# Patient Record
Sex: FEMALE | Employment: STUDENT | URBAN - METROPOLITAN AREA
[De-identification: names, ages, dates, MRNs, and addresses within clinical notes are randomized per-mention and may not be internally consistent; named-entity substitution may affect disease eponyms.]

---
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
## 2021-09-17 PROBLEM — M25.312 INSTABILITY OF LEFT SHOULDER JOINT: Status: RESOLVED | Noted: 2018-02-06 | Resolved: 2021-09-17

## 2021-09-17 PROBLEM — S52.611A CLOSED DISPLACED FRACTURE OF STYLOID PROCESS OF RIGHT ULNA, INITIAL ENCOUNTER: Status: RESOLVED | Noted: 2020-02-05 | Resolved: 2021-09-17

## 2024-10-17 ENCOUNTER — OFFICE VISIT (OUTPATIENT)
Dept: INTEGRATIVE MEDICINE | Facility: CLINIC | Age: 24
End: 2024-10-17

## 2024-10-17 DIAGNOSIS — G47.00 INSOMNIA, UNSPECIFIED TYPE: Primary | ICD-10-CM

## 2024-10-17 DIAGNOSIS — M54.2 NECK PAIN ON LEFT SIDE: ICD-10-CM

## 2024-10-18 NOTE — PATIENT INSTRUCTIONS
Increase water intake to 70 Oz per day.    Make sure to do something you enjoy everyday; hiking, painting, etc.    Consider joining a Jose ji or yoga class    Herbal medicine:  You will receive an email from a company called [employer].  Once you register, you will see the herbal medicine that I prescribed along with the directions.

## 2024-10-18 NOTE — PROGRESS NOTES
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   Chief Complaint: Insomnia  Secondary Complaint: LT neck pain  Tertiary Complaint: anxiety    Self reported health status:     Patient reported severity of symptom(s) over the last 24 hours  With zero reporting no symptoms and 10 reporting the worst severity    Symptom 1: 4  Symptom 2: 3  Symptom 3: 6    Response to last TX: NA    HPI: Bobbi suffers from insomnia for 2-3 years which started sometime during the pandemic. She struggles falling asleep but finds that the melatonin helps her stay asleep.  Due to the lack of sleep she feels fatigued and will sometimes miss appointments.    Additionally, he anxiety is high and has been that way for at least 5 years.  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    Today she has LT sided neck pain which is not uncommon complaint. The trapezius is hypertonic.    Menstrual cycle is 28 days and recently started having bad cramps before and during day 1 of the menstrual cycle.    We discussed the importance of doing things that she enjoys such as painting and walking in nature.  Additionally, yoga, Jose ji and qi gong were suggested.    Will started Rosa alisia wan herbal medicine.    Objective (Pulse, Tongue, Vitals): /52. Pulse is wiry in LT annabel and sl. Big and rapid.    TCM Diagnosis: LR heat disturbing the HT    Plan of Care: Acupuncture Therapy  1st set: Bilateral frost men, an diane  2nd set: RT: LI 4, SJ 5, LR 8, SP 9, 6, 4, LT: PC 6, HT 5, LG 7, ST 40, GB 41  3rd set: RT: LR 5, 4.5  Chinese herbal medicine: rosa alisia wan 4 BID    Patient Goals: Improve quality and length of sleep so she has enough energy during the day, improve anxiety by 50%    Face Time: 38 minutes  Total Time: 60 minutes      Treatment performed by Jagjit CAMP LAc. at [facility].    No follow-ups on file.    Jagjit Mendez L.AC  10/17/2024  7:07 PM

## 2024-10-25 ENCOUNTER — OFFICE VISIT (OUTPATIENT)
Dept: INTEGRATIVE MEDICINE | Facility: CLINIC | Age: 24
End: 2024-10-25

## 2024-10-25 DIAGNOSIS — F41.9 ANXIETY: ICD-10-CM

## 2024-10-25 DIAGNOSIS — G47.00 INSOMNIA, UNSPECIFIED TYPE: Primary | ICD-10-CM

## 2024-10-25 NOTE — PROGRESS NOTES
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   Chief Complaint: Insomnia  Secondary Complaint: LT neck pain  Tertiary Complaint: anxiety    Self reported health status:     Patient reported severity of symptom(s) over the last 24 hours  With zero reporting no symptoms and 10 reporting the worst severity    Symptom 1: 4  Symptom 2: 3  Symptom 3: 6    Response to last TX: Bobbi felt very relaxed after the last tx and her anxiety decreased.  She has not noticed ain her insomnia and we discussed bedtime rituals and the possibility of journaling before bed.    Neck pain is more centrally located today but improved after the tx for 2-3 days.    Is on day 14 of menstrual cycle.    She has yet to start the Chinese herbal medicine.    HPI 10/17/23: Bobbi suffers from insomnia for 2-3 years which started sometime during the pandemic. She struggles falling asleep but finds that the melatonin helps her stay asleep.  Due to the lack of sleep she feels fatigued and will sometimes miss appointments.    Additionally, he anxiety is high and has been that way for at least 5 years.  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    Today she has LT sided neck pain which is not uncommon complaint. The trapezius is hypertonic.    Menstrual cycle is 28 days and recently started having bad cramps before and during day 1 of the menstrual cycle.    We discussed the importance of doing things that she enjoys such as painting and walking in nature.  Additionally, yoga, Jose ji and qi gong were suggested.    Will started Rosa alisia wan herbal medicine.    Objective (Pulse, Tongue, Vitals): /52. Pulse is wiry in LT annabel and sl. Big and rapid.    TCM Diagnosis: LR heat disturbing the HT    Plan of Care: Acupuncture Therapy  1st set: Bilateral frost men, an diane  2nd set: RT: LI 4, SJ 5, LR 8, SP 9, 6, 4, LT: Tung22.01, 22.02, ST 40, GB 41  3rd set: RT: LR 5, 4.5  Chinese herbal medicine: rosa alisia wan  4 BID    Patient Goals: Improve quality and length of sleep so she has enough energy during the day, improve anxiety by 50%    Face Time: 28 minutes  Total Time: 50 minutes      Treatment performed by Jagjit CAMP LAc. at [facility].    No follow-ups on file.    Jagjit Mendez L.AC  10/17/2024  7:07 PM

## 2024-11-04 ENCOUNTER — OFFICE VISIT (OUTPATIENT)
Dept: INTEGRATIVE MEDICINE | Facility: CLINIC | Age: 24
End: 2024-11-04

## 2024-11-04 DIAGNOSIS — G47.00 INSOMNIA, UNSPECIFIED TYPE: Primary | ICD-10-CM

## 2024-11-04 NOTE — PROGRESS NOTES
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   Chief Complaint: Insomnia  Secondary Complaint: neck pain  Tertiary Complaint: anxiety    Self reported health status:     Patient reported severity of symptom(s) over the last 24 hours  With zero reporting no symptoms and 10 reporting the worst severity    Symptom 1: 3  Symptom 2: 2-3  Symptom 3: 5-6    Response to last TX: Bobbi is getting more sleep but still wakes frequently. Her energy has improved which may be more correlated with the increase in sleep.    Neck pain is more centrally located today but improved after the tx for 2-3 days.    Is on day 21 of menstrual cycle.    Anxiety is slightly improved but present.  She has been reading more and finds this soothing.  Is also planning on painting today.    She has yet to start the Chinese herbal medicine but is planning on doing that tonight.    HPI 10/17/23: Bobbi suffers from insomnia for 2-3 years which started sometime during the pandemic. She struggles falling asleep but finds that the melatonin helps her stay asleep.  Due to the lack of sleep she feels fatigued and will sometimes miss appointments.    Additionally, he anxiety is high and has been that way for at least 5 years.  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    Today she has LT sided neck pain which is not uncommon complaint. The trapezius is hypertonic.    Menstrual cycle is 28 days and recently started having bad cramps before and during day 1 of the menstrual cycle.    We discussed the importance of doing things that she enjoys such as painting and walking in nature.  Additionally, yoga, Jose ji and qi gong were suggested.    Will started Neha crump joe herbal medicine.    Objective (Pulse, Tongue, Vitals): /72. Pulse is wiry in LT annabel and sl. Big and rapid.    TCM Diagnosis: LR heat disturbing the HT    Plan of Care: Acupuncture Therapy  1st set: Bilateral frost men, an diane  2nd set: RT: ELLIS  4, SJ 5, LR 8, SP 9, 6, 4, LT: PC 7.2, HT 5, LG 7, ST 40, GB 41  3rd set: RT: LR 5, 4.5  Chinese herbal medicine: rosa alisia wan 4 BID    Patient Goals: Improve quality and length of sleep so she has enough energy during the day, improve anxiety by 50%    Face Time: 28 minutes  Total Time: 50 minutes      Treatment performed by Jagjit CAMP LAc. at [facility].    No follow-ups on file.    Jagjit Mendez L.AC  10/17/2024  7:07 PM

## 2024-12-16 ENCOUNTER — HOSPITAL ENCOUNTER (OUTPATIENT)
Dept: GENERAL RADIOLOGY | Age: 24
Discharge: HOME | End: 2024-12-16
Attending: STUDENT IN AN ORGANIZED HEALTH CARE EDUCATION/TRAINING PROGRAM
Payer: COMMERCIAL

## 2024-12-16 ENCOUNTER — TELEPHONE (OUTPATIENT)
Dept: ORTHOPEDICS CLINIC | Facility: CLINIC | Age: 24
End: 2024-12-16

## 2024-12-16 ENCOUNTER — OFFICE VISIT (OUTPATIENT)
Dept: ORTHOPEDICS CLINIC | Facility: CLINIC | Age: 24
End: 2024-12-16
Payer: COMMERCIAL

## 2024-12-16 DIAGNOSIS — M54.2 NECK PAIN: ICD-10-CM

## 2024-12-16 DIAGNOSIS — M54.2 NECK PAIN: Primary | ICD-10-CM

## 2024-12-16 PROCEDURE — 99204 OFFICE O/P NEW MOD 45 MIN: CPT | Performed by: STUDENT IN AN ORGANIZED HEALTH CARE EDUCATION/TRAINING PROGRAM

## 2024-12-16 PROCEDURE — 72050 X-RAY EXAM NECK SPINE 4/5VWS: CPT | Performed by: STUDENT IN AN ORGANIZED HEALTH CARE EDUCATION/TRAINING PROGRAM

## 2024-12-16 PROCEDURE — G2211 COMPLEX E/M VISIT ADD ON: HCPCS | Performed by: STUDENT IN AN ORGANIZED HEALTH CARE EDUCATION/TRAINING PROGRAM

## 2024-12-16 RX ORDER — MELOXICAM 7.5 MG/1
7.5 TABLET ORAL DAILY
Qty: 14 TABLET | Refills: 0 | Status: SHIPPED | OUTPATIENT
Start: 2024-12-16

## 2024-12-17 NOTE — H&P
[facility] - ORTHOPEDICS  [address], [address], [place] [address]  [address] [place] [id number]  [phone]     NEW PATIENT VISIT - HISTORY AND PHYSICAL EXAMINATION     Name: Bobbi Cruz   MRN: [id number]  Date: 12/16/24       CC: neck pain    REFERRED BY: Marisol Rodas MD    HPI:   Bobbi Cruz is a very pleasant 24 year old female who presents today for evaluation of neck pain. The distribution of symptoms are: 100% neck pain and 0% arm pain. The symptoms began many year(s) ago without any significant injury. Since the onset, the symptoms have wax and waned over time.  The patient reports no numbness and no weakness.  The symptom characteristics are as follows: Patient is a 24-year-old female presenting with predominate neck pain.  No radicular symptoms.  Patient has been working with acupuncture..     Prior spine surgery: none.    Bowel and bladder symptoms: absent.    The patient has not had issues with balance and/or hand dexterity problems such as changes in penmanship or the use of buttons or zippers.    Treatment up to this time has included:    Evaluation: other [facility] provider  NSAIDS: have not been prescribed  Narcotic use: None  Physical therapy: None  Spinal injections: None      PMH:   Past Medical History:    Closed displaced fracture of styloid process of right ulna, initial encounter    Hematuria    Instability of left shoulder joint    Kidney anomaly, congenital    Two vessel cord (HCC)       PAST SURGICAL HX:  Past Surgical History:   Procedure Laterality Date    Anesth,nose,sinus surgery  12/2020    deviated septum     Other surgical history      Biopsy left leg    [place] teeth removed  12/2020       FAMILY HX:  Family History   Problem Relation Age of Onset    Diabetes Maternal Grandfather     Heart Disease Maternal Grandfather     Heart Disease Paternal Grandfather     Cancer Paternal Grandfather     Diabetes Paternal Grandfather     High  Blood Pressure Paternal Grandfather     High Blood Pressure Father     Arthritis Paternal Grandmother        ALLERGIES:  Seasonal    MEDICATIONS:   Current Outpatient Medications   Medication Sig Dispense Refill    Meloxicam 7.5 MG Oral Tab Take 1 tablet (7.5 mg total) by mouth daily. 14 tablet 0    Clindamycin Phosphate 1 % External Lotion Apply to face and back every morning (Patient not taking: Reported on 9/17/2021) 60 mL 3    tretinoin 0.025 % External Cream Apply nightly to face followed by moisturizer. (Patient not taking: Reported on 9/17/2021) 20 g 2       ROS: A comprehensive 14 point review of systems was performed and was negative aside from the aforementioned per history of present illness.    SOCIAL HX:  Social History     Tobacco Use    Smoking status: Never    Smokeless tobacco: Never   Substance Use Topics    Alcohol use: Not Currently     Alcohol/week: 0.0 standard drinks of alcohol         PE:   There were no vitals filed for this visit.  Estimated body mass index is 26.57 kg/m² as calculated from the following:    Height as of 10/20/21: 5' 3\" (1.6 m).    Weight as of 10/20/21: 150 lb (68 kg).    Physical Exam  Constitutional:       Appearance: Normal appearance.   HENT:      Head: Normocephalic and atraumatic.   Eyes:      Extraocular Movements: Extraocular movements intact.   Cardiovascular:      Pulses: Normal pulses. Skin warm and well perfused.  Pulmonary:      Effort: Pulmonary effort is normal. No respiratory distress.   Skin:     General: Skin is warm.   Psychiatric:         Mood and Affect: Mood normal.     Spine Exam:    Normal gait without difficulty  Able to heel, toe, tandem gait without difficulty  Level shoulders and hips in even stance    Normal C-spine ROM    No tenderness to palpation of C-spine    Spluring: negative    Daniels's: negative  IRR: negative  Sustained clonus: negative     and release: normal  Rhomberg: normal    UE Strength: 5/5 D Bi Tri WE FDS IO  UE Sensation:  normal in C5-T1 distribution  UE reflexes: normal    Radiographic Examination/Diagnostics:  XR personally viewed, independently interpreted and radiology report was reviewed.  X-ray of the cervical spine demonstrates mild degenerative changes C4-5 otherwise no acute pathology.    IMPRESSION: Bobbi Cruz is a 24 year old female with neck pain, likely myofascial    PLAN:     We reviewed the patients history, symptoms, exam findings, and imaging today.  We had a detailed discussion outlining the etiology, anatomy, pathophysiology, and natural history of neck pain. The typical management of this condition may include lifestyle modification, NSAIDs, physical therapy. Based on our discussion today we would like to have the patient initiate our recommendations for continued conservative therapy in the treatment of their condition noted in the assessment section.    - Referred patient to PT  - Provided HEP  - Prescribed Meloxicam    FOLLOW-UP:  We will see her back in follow-up in 3 months, or sooner if any problems arise. Patient understands and agrees with plan.       Bienvenido Valderrama MD  Orthopedic Spine Surgeon  [facility]   [address] [place] [address], [email or web address]  [email or web address]  t: [phone]   f: [phone]        This note was dictated using Dragon software.  While it was briefly proofread prior to completion, some grammatical, spelling, and word choice errors due to dictation may still occur.

## 2024-12-19 ENCOUNTER — OFFICE VISIT (OUTPATIENT)
Dept: INTEGRATIVE MEDICINE | Facility: CLINIC | Age: 24
End: 2024-12-19

## 2024-12-19 DIAGNOSIS — M54.2 NECK PAIN: ICD-10-CM

## 2024-12-19 DIAGNOSIS — G47.00 INSOMNIA, UNSPECIFIED TYPE: Primary | ICD-10-CM

## 2024-12-19 NOTE — PROGRESS NOTES
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   Chief Complaint: Insomnia  Secondary Complaint: neck pain  Tertiary Complaint: anxiety    Self reported health status:     Patient reported severity of symptom(s) over the last 24 hours  With zero reporting no symptoms and 10 reporting the worst severity    Symptom 1: 3-4  Symptom 2: 2-5  Symptom 3: 5    Response to last TX: Have not seen Bobbi for 1 month.    She is getting more sleep but still wakes frequently.  She stated that it is the most difficult going to sleep.  The acupuncture had been helping with her sleep prior to her vacation.It takes her along time to sleep and then she feels exhausted because she can't sleep.    Neck pain has been pronounced over the past two weeks and may be worse when working the computer.  We discussed proper positioning.  Qi gong movements were provided to help with neck tension.  Evaluation by Bienvenido Valderrama was negative.    Is on day 3 of menstrual cycle and last valerio was 30 days. Only mild cramps and mood swings were not severe.    Anxiety is slightly improved but present. She has been painting and this helps and also got a new job.    She started the Chinese herbal medicine but mistakenly only took 4 ills one time per day.  We discussed the importance of taking the full dose.  Recently she tried ashwaganda while on vacation and this helped.    HPI 10/17/23: Bobbi suffers from insomnia for 2-3 years which started sometime during the pandemic. She struggles falling asleep but finds that the melatonin helps her stay asleep.  Due to the lack of sleep she feels fatigued and will sometimes miss appointments.    Additionally, he anxiety is high and has been that way for at least 5 years.  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    Today she has LT sided neck pain which is not uncommon complaint. The trapezius is hypertonic.    Menstrual cycle is 28 days and recently started having bad cramps  before and during day 1 of the menstrual cycle.    We discussed the importance of doing things that she enjoys such as painting and walking in nature.  Additionally, yoga, Jose ji and qi gong were suggested.    Will started Rosa alisia wan herbal medicine.    Objective (Pulse, Tongue, Vitals): /72. Pulse is wiry in LT annabel and sl. Big and rapid.    TCM Diagnosis: LR heat disturbing the HT    Plan of Care: Acupuncture Therapy  1st set: Bilateral frost men, an diane, GB 21  2nd set: RT: LI 4, SJ 5, LR 8, SP 9, 6, 4, LT: PC 6, HT 5, LG 7, ST 40, GB 41  3rd set: RT: LR 5, 4.5  Chinese herbal medicine: rosa alisia wan 4 BID    Patient Goals: Improve quality and length of sleep so she has enough energy during the day, improve anxiety by 50%    Face Time: 28 minutes  Total Time: 50 minutes      Treatment performed by Jagjit CAMP LAc. at [facility].    No follow-ups on file.    Jagjit Mendez L.AC  10/17/2024  7:07 PM

## 2024-12-19 NOTE — PATIENT INSTRUCTIONS
Practice the qi gong exercises as demonstrated today.  Turn the head with coordinated breathing - 8 times  Touch the walter and pull down with coordinated breathing- 8 times  Bring hands up and down with coordinated breathing 5-10 minutes

## 2024-12-23 ENCOUNTER — OFFICE VISIT (OUTPATIENT)
Dept: INTEGRATIVE MEDICINE | Facility: CLINIC | Age: 24
End: 2024-12-23

## 2024-12-23 DIAGNOSIS — R51.9 TEMPORAL HEADACHE: Primary | ICD-10-CM

## 2024-12-24 NOTE — PROGRESS NOTES
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   Chief Complaint: HA  Secondary Complaint: Neck pain  Tertiary Complaint: anxiety  Quaternary Complaint: Insomnia     Self reported health status:     Patient reported severity of symptom(s) over the last 24 hours  With zero reporting no symptoms and 10 reporting the worst severity    Symptom 1: 7  Symptom 2: 2  Symptom 3: 3-4  Symptom 4: 4    Response to last TX: Bobbi has noticed a significant improvement in neck pain.    Her insomnia varies from day to day; one day she sleeps and one day she has a hard time sleeping.    The anxiety is improved but can be exacerbated by interacting with her family sometimes.  She would like to have her own place.  When she was in [place] on vacation her sleep was much improved.    Her main complaint today is a severe temporal HA that extends to the occiput. She has had it all day and it is rated 7-8/10.    Is on day 7 of menstrual cycle and last valerio was 30 days. Only mild cramps and mood swings were not severe.      She started the Chinese herbal medicine and is now taking the full dose but has not noticed a change.  Recently she tried ashwaganda while on vacation and this helped.    HPI 10/17/23: Bobbi suffers from insomnia for 2-3 years which started sometime during the pandemic. She struggles falling asleep but finds that the melatonin helps her stay asleep.  Due to the lack of sleep she feels fatigued and will sometimes miss appointments.    Additionally, he anxiety is high and has been that way for at least 5 years.  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    Today she has LT sided neck pain which is not uncommon complaint. The trapezius is hypertonic.    Menstrual cycle is 28 days and recently started having bad cramps before and during day 1 of the menstrual cycle.    We discussed the importance of doing things that she enjoys such as painting and walking in nature.  Additionally,  yoga, Jose ji and qi gong were suggested.    Will started Rosa alisia wan herbal medicine.    Objective (Pulse, Tongue, Vitals): /72. Pulse is wiry in LT annabel and sl. Big and rapid.    TCM Diagnosis: LR heat disturbing the HT    Plan of Care: Acupuncture Therapy  1st set: Bilateral frost men,  2nd set: LT: HT 8, GB 34, 35, 36, RT: Tung 11.01  3rd set: Bilateral: LR 5, 4.5  Chinese herbal medicine: rosa alisia wan 4 BID    Patient Goals: Improve quality and length of sleep so she has enough energy during the day, improve anxiety by 50%    Face Time: 28 minutes  Total Time: 50 minutes      Treatment performed by Jagjit CAMP LAc. at [facility].    No follow-ups on file.    Jagjit Mendez L.AC  10/17/2024  7:07 PM

## 2024-12-30 ENCOUNTER — HOSPITAL ENCOUNTER (EMERGENCY)
Facility: HOSPITAL | Age: 24
Discharge: LEFT BEFORE DISCHARGE | End: 2024-12-30
Payer: COMMERCIAL

## 2025-03-11 ENCOUNTER — OFFICE VISIT (OUTPATIENT)
Dept: INTEGRATIVE MEDICINE | Facility: CLINIC | Age: 25
End: 2025-03-11
Payer: COMMERCIAL

## 2025-03-11 VITALS
BODY MASS INDEX: 26.12 KG/M2 | DIASTOLIC BLOOD PRESSURE: 64 MMHG | SYSTOLIC BLOOD PRESSURE: 102 MMHG | HEIGHT: 63 IN | WEIGHT: 147.44 LBS

## 2025-03-11 DIAGNOSIS — F41.9 ANXIETY: ICD-10-CM

## 2025-03-11 DIAGNOSIS — E55.9 VITAMIN D DEFICIENCY: Primary | ICD-10-CM

## 2025-03-11 DIAGNOSIS — G47.00 INSOMNIA, UNSPECIFIED TYPE: ICD-10-CM

## 2025-03-11 DIAGNOSIS — E34.9 HORMONE IMBALANCE: ICD-10-CM

## 2025-03-11 DIAGNOSIS — E28.2 PCOS (POLYCYSTIC OVARIAN SYNDROME): ICD-10-CM

## 2025-03-11 PROCEDURE — G2211 COMPLEX E/M VISIT ADD ON: HCPCS | Performed by: PHYSICIAN ASSISTANT

## 2025-03-11 PROCEDURE — 99204 OFFICE O/P NEW MOD 45 MIN: CPT | Performed by: PHYSICIAN ASSISTANT

## 2025-03-11 PROCEDURE — 3008F BODY MASS INDEX DOCD: CPT | Performed by: PHYSICIAN ASSISTANT

## 2025-03-11 PROCEDURE — 3078F DIAST BP <80 MM HG: CPT | Performed by: PHYSICIAN ASSISTANT

## 2025-03-11 PROCEDURE — 3074F SYST BP LT 130 MM HG: CPT | Performed by: PHYSICIAN ASSISTANT

## 2025-03-11 RX ORDER — METFORMIN HYDROCHLORIDE 500 MG/1
500 TABLET, EXTENDED RELEASE ORAL
COMMUNITY
Start: 2025-01-27

## 2025-03-11 NOTE — PATIENT INSTRUCTIONS
Vitamin D3 + K from [employer]    Provides 5,000 IU per softgel of vitamin D (as D3), designed for greater absorption. This high-potency formula also includes bioavailable forms of vitamin K2 (menaquinone-7) to complement vitamin D.    ?Suggested Use:  Take one softgel daily     2. Therapist: Ines Gallego, [facility]  [place]     3. Inositol (Powder) by [employer] for healthy blood sugar regulation    Inositol is a component of the B-complex family. Natanael-inositol is the primary form of inositol found in the central nervous system. It plays an important role in cell membrane formation and serves as part of the phosphatidylinositol secondary messenger system, supporting serotonin, norepinenephrine and cholinergic receptor function. As a result, inositol may support healthy mood, emotional wellness and behavior, and help lessen occasional nervous tension. Research also suggests that natanael-inositol may help to support healthy ovulatory activity, ovarian function, and reproductive system function. Also, Natanael-inositol inhibits duodenal glucose absorption and reduces blood glucoses rises.      Take 2 scoops in water, 2 times daily, with or between meals.  2 scoops = 4g     4. Best-Rest Formula by [employer]    Best-Rest Formula supports healthy sleep cycles by encouraging an easy transition to sleep and a restful nights sleep. The supplement contains passionflower, chamomile, lemon balm and hops, which have been clinically shown to naturally calm and relax the central nervous system.*    Promotes the onset of sleep as well as sleep quality*  Supports natural relaxation of the nervous system*  Made with flower extracts and other natural ingredients  Made with hypoallergenic, vegan ingredients    Take 1-2 capsules 30-60 minutes before bedtime.     5. Go get labs drawn day 20-22 of menstrual cycle.

## 2025-03-11 NOTE — PROGRESS NOTES
Bobbi Cruz is a 24 year old female.  Chief Complaint   Patient presents with    Establish Care     GI       HPI:   24 yr old female new patient presents today for an initial evaluation regarding the above.     Sleep Issues- needs Lunesta to fall asleep but still wakes up during night and hard to get back to sleep.   X5yrs, but worse in the last year. About that time she moved back in with parents.   Works out around 7-8pm   Goes to bed 11 or 12    Anxious and feels burnt out.   Feels she has always been anxious person.   Did see a therapist in the past but was not helpful.     Chest pain and SOB at they gym- picked up a heart monitor, Rx'd by PCP.   Suspects cyst in chest is causing the pain.    GI: BM daily     Gyn: Menses became more painful in the last 1-2yrs  H/o high testosterone, suspect PCOS (chin hair growth) - so was prescribed Metformin 500mg, Spironolactone for it (from Dr. Bower) and naproxen for pain (from Lita Cardoso)     Energy very low after work     Low Vit D  Low Ferritin - takes iron daily, but still low, going for infusion soon    Pert Med Hx      Fam Hx Significant for:  Mental Health, Mom  Prediabetic - Mom and Dad    Lifestyle Factors affecting health:  Diet - Eats at home, avoids fast food.   Could use to eat more protein, otherwise balanced.     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        REVIEW OF SYSTEMS:   Review of Systems     Negative except for pertinent ROS in HPI      FAMILY HISTORY:      Family History   Problem Relation Age of Onset    Diabetes Maternal Grandfather     Heart Disease Maternal Grandfather     Heart Disease Paternal Grandfather     Cancer Paternal Grandfather     Diabetes Paternal Grandfather     High Blood Pressure Paternal Grandfather     High Blood  Pressure Father     Arthritis Paternal Grandmother        MEDICAL HISTORY:     Past Medical History:    Closed displaced fracture of styloid process of right ulna, initial encounter    Hematuria    Instability of left shoulder joint    Kidney anomaly, congenital    Two vessel cord (HCC)       CURRENT MEDICATIONS:     Current Outpatient Medications   Medication Sig Dispense Refill    metFORMIN  MG Oral Tablet 24 Hr 1 tablet (500 mg total).      Meloxicam 7.5 MG Oral Tab Take 1 tablet (7.5 mg total) by mouth daily. (Patient not taking: Reported on 3/11/2025) 14 tablet 0    Clindamycin Phosphate 1 % External Lotion Apply to face and back every morning (Patient not taking: Reported on 9/17/2021) 60 mL 3    tretinoin 0.025 % External Cream Apply nightly to face followed by moisturizer. (Patient not taking: Reported on 9/17/2021) 20 g 2       SOCIAL HISTORY:       Social History     Socioeconomic History    Marital status: Single   Tobacco Use    Smoking status: Never    Smokeless tobacco: Never   Vaping Use    Vaping status: Never Used   Substance and Sexual Activity    Alcohol use: Not Currently     Comment: occacially    Drug use: Not Currently    Sexual activity: Never     Social Drivers of Health     Food Insecurity: Low Risk  (11/13/2023)    Received from [facility]    Food Insecurity     Have there been times that your food ran out, and you didn't have money to get more?: No     Are there times that you worry that this might happen?: No   Transportation Needs: Low Risk  (11/13/2023)    Received from [facility]    Transportation Needs     Do you have trouble getting transportation to medical appointments?: No       SURGICAL HISTORY:     Past Surgical History:   Procedure Laterality Date    Anesth,nose,sinus surgery  12/2020    deviated septum     Other surgical history      Biopsy left leg    [place] teeth removed  12/2020       PHYSICAL EXAM:     Vitals:    03/11/25  1452   BP: 102/64   BP Location: Right arm   Patient Position: Sitting   Cuff Size: adult   Weight: 147 lb 6.6 oz (66.9 kg)   Height: 5' 3\" (1.6 m)       Physical Exam  Vitals reviewed.   Constitutional:       General: She is not in acute distress.     Appearance: Normal appearance.   HENT:      Mouth/Throat:      Mouth: Mucous membranes are moist.      Pharynx: Oropharynx is clear.   Eyes:      Extraocular Movements: Extraocular movements intact.      Conjunctiva/sclera: Conjunctivae normal.   Cardiovascular:      Rate and Rhythm: Normal rate and regular rhythm.      Heart sounds: Normal heart sounds.   Pulmonary:      Effort: Pulmonary effort is normal.      Breath sounds: Normal breath sounds.   Musculoskeletal:         General: No swelling or deformity.   Skin:     General: Skin is warm and dry.   Neurological:      General: No focal deficit present.      Mental Status: She is alert and oriented to person, place, and time.   Psychiatric:         Mood and Affect: Mood normal.         Behavior: Behavior normal.         Thought Content: Thought content normal.         Judgment: Judgment normal.          ASSESSMENT AND PLAN:     No visits with results within 6 Month(s) from this visit.   Latest known visit with results is:   Orders Only on 09/19/2021   Component Date Value Ref Range Status    SARS-CoV-2 (COVID-19) RT-PCR (Gene* 09/19/2021 NOT DETECTED  Not Detected Final    A \"Detected\" result is considered a positive test result for COVID-19.   A \"Not Detected\" result for this test means that SARS-CoV-2 RNA was not present in the sample above the limit of detection of the assay.   An \"Inconclusive\" result for this test means that the presence or absence of COVID-19 viral RNA cannot be determined, and recollection and testing by a different method should be considered.     This test is intended for the qualitative detection of nucleic acid from SARS-CoV-2 viral RNA for individuals who are suspected of COVID-19  infection by their healthcare provider.   Test performed using the Xpert Xpress SARS-CoV-2 assay on the GeneXpert instrument, [employer] [place] [address].   This test is being used under the Food and Drug Administration's Emergency Use Authorization.   The authorized Fact Sheet for Healthcare Providers for this assay is available upon request from the laboratory.      Juju Fiore Early Antigen IgG Brennen* 09/19/2021 Negative  Negative Final    Juju Fiore Early Antigen IgG Brennen* 09/19/2021 <0.2  AI Final    Juju Fiore Viral Capsid Antigen * 09/19/2021 Negative  Negative Final    Juju Fiore Viral Capsid Antigen * 09/19/2021 <0.2  AI Final    Juju Fiore Viral Capsid Antigen * 09/19/2021 Negative  Negative Final    Juju Fiore Viral Capsid Antigen * 09/19/2021 <0.2  AI Final    TSH 09/19/2021 0.867  0.270 - 4.200 mIU/L Final      XR CERVICAL SPINE (4VIEWS) (CPT=72050)    Result Date: 12/16/2024  CONCLUSION:  1. Mild soft tissue calcification along the anterior margin of the C4-5 disc space, perhaps representing sequela of remote injury to the anterior longitudinal ligament at this level.  There is certainly no evidence of acute osseous injuries or soft tissue swelling. 2. No abnormal motion/subluxations upon flexion or extension.   LOCATION:  [facility]   Dictated by (CST): Jacquelyn Fajardo DO on 12/16/2024 at 5:28 PM     Finalized by (CST): Jacquelyn Fajardo DO on 12/16/2024 at 5:29 PM        1. Vitamin D deficiency    2. Anxiety    3. Insomnia, unspecified type    4. Hormone imbalance  - Comp Metabolic Panel (14); Future  - Dehydroepiandrosterone Sulfate; Future  - Pregnenolone by MS/MS, Serum; Future  - Insulin; Future  - Hemoglobin A1C; Future  - Progesterone; Future  - Estrogens Fractionated, Serum; Future  - Testosterone,Total and Weakly Bound w/ SHBG; Future    5. PCOS (polycystic ovarian syndrome)  - Comp Metabolic Panel (14); Future  - Dehydroepiandrosterone Sulfate; Future  - Pregnenolone by MS/MS, Serum;  Future  - Insulin; Future  - Hemoglobin A1C; Future  - Progesterone; Future  - Estrogens Fractionated, Serum; Future  - Testosterone,Total and Weakly Bound w/ SHBG; Future      Time spent with patient: Over 50 minutes spent in chart review and in direct communication with patient obtaining and reviewing history, creating a unique care plan, explaining the rationale for treatment, reviewing potential SE and overall treatment plan,  documenting all clinical information in Epic. Over 50% of this time was in education, counseling and coordination of care.     Problem List Items Addressed This Visit    None  Visit Diagnoses       Vitamin D deficiency    -  Primary    Relevant Medications    metFORMIN  MG Oral Tablet 24 Hr    Anxiety        Insomnia, unspecified type        Hormone imbalance        Relevant Medications    metFORMIN  MG Oral Tablet 24 Hr    Other Relevant Orders    Comp Metabolic Panel (14)    Dehydroepiandrosterone Sulfate    Pregnenolone by MS/MS, Serum    Insulin    Hemoglobin A1C    Progesterone    Estrogens Fractionated, Serum    Testosterone,Total and Weakly Bound w/ SHBG    PCOS (polycystic ovarian syndrome)        Relevant Medications    metFORMIN  MG Oral Tablet 24 Hr    Other Relevant Orders    Comp Metabolic Panel (14)    Dehydroepiandrosterone Sulfate    Pregnenolone by MS/MS, Serum    Insulin    Hemoglobin A1C    Progesterone    Estrogens Fractionated, Serum    Testosterone,Total and Weakly Bound w/ SHBG             Anxiety - reports she has had most her life but has worsened more recently. She is open to seeking a therapist again, rec below. She is trying to avoid a lot of prescription medications. Discussed the need in the short term.     Insomnia - Difficult to fall and stay asleep.   Rec below to try. Discussed how this impacts all other factors of yudelka.     ?PCOS - Pt was prescribed metformin reportedly fro hormone balancing. No estradiol, progesterone, or HgA1c  recently found.   ->Check labs and rec inositol for insulin resistance and anxiety support    Vit D deficiency - recommendation below      Given further recommendations as below    Orders Placed This Visit:  Orders Placed This Encounter   Procedures    Comp Metabolic Panel (14)    Dehydroepiandrosterone Sulfate    Pregnenolone by MS/MS, Serum    Insulin    Hemoglobin A1C    Progesterone    Estrogens Fractionated, Serum    Testosterone,Total and Weakly Bound w/ SHBG     No orders of the defined types were placed in this encounter.      Patient Instructions   Vitamin D3 + K from [employer]    Provides 5,000 IU per softgel of vitamin D (as D3), designed for greater absorption. This high-potency formula also includes bioavailable forms of vitamin K2 (menaquinone-7) to complement vitamin D.    ?Suggested Use:  Take one softgel daily     2. Therapist: Ines Gallego, [facility]  [facility]  [place]     3. Inositol (Powder) by [employer]caps[employer] for healthy blood sugar regulation    Inositol is a component of the B-complex family. Natanael-inositol is the primary form of inositol found in the central nervous system. It plays an important role in cell membrane formation and serves as part of the phosphatidylinositol secondary messenger system, supporting serotonin, norepinenephrine and cholinergic receptor function. As a result, inositol may support healthy mood, emotional wellness and behavior, and help lessen occasional nervous tension. Research also suggests that natanael-inositol may help to support healthy ovulatory activity, ovarian function, and reproductive system function. Also, Natanael-inositol inhibits duodenal glucose absorption and reduces blood glucoses rises.      Take 2 scoops in water, 2 times daily, with or between meals.  2 scoops = 4g     4. Best-Rest Formula by [employer]    Best-Rest Formula supports healthy sleep cycles by encouraging an easy transition to sleep and a restful nights  sleep. The supplement contains passionflower, chamomile, lemon balm and hops, which have been clinically shown to naturally calm and relax the central nervous system.*    Promotes the onset of sleep as well as sleep quality*  Supports natural relaxation of the nervous system*  Made with flower extracts and other natural ingredients  Made with hypoallergenic, vegan ingredients    Take 1-2 capsules 30-60 minutes before bedtime.     5. Go get labs drawn day 20-22 of menstrual cycle.     Return in about 3 months (around 6/11/2025) for x60min: anxiety and poor sleep.    Patient affirmed understanding of plan and all questions were answered.     Adrianne Guadalupe PA-C

## 2025-04-07 ENCOUNTER — OFFICE VISIT (OUTPATIENT)
Dept: INTEGRATIVE MEDICINE | Facility: CLINIC | Age: 25
End: 2025-04-07
Payer: COMMERCIAL

## 2025-04-07 DIAGNOSIS — N94.6 DYSMENORRHEA: ICD-10-CM

## 2025-04-07 DIAGNOSIS — G47.00 INSOMNIA, UNSPECIFIED TYPE: Primary | ICD-10-CM

## 2025-04-07 NOTE — PROGRESS NOTES
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   Chief Complaint: Insomnia  Secondary Complaint: Dysmenorrhea  Tertiary Complaint: Anxiety    Self reported health status:     Patient reported severity of symptom(s) over the last 24 hours  With zero reporting no symptoms and 10 reporting the worst severity    Symptom 1: 4  Symptom 2: 3-5  Symptom 3: 3-4    Response to last TX: Have not seen Bobbi since 12/23/24.    HPI: Bobbi has noticed a significant improvement in neck pain and HA and it has not been a significant complaint.    Her insomnia has improved since she started taking Lunesta and when she stops the insomnia gets worse.  Overall, she is sleeping better but will still have days when it is hard to sleep.    She continues to have menstrual cramps and this period day 1 was more painful than usual.  She does get breast tenderness, but moodiness is hard to discern as she tends toward moodiness.    She would like to re-start the rosa alisia wan at 4 BID as she thinks that helped in December; especially with her anxiety.    Recent blood panel depicted low Vit D3 - 20, low ferritin -12 as well as high testosterone -7.3.  The herbs may help with the testosterone. We discussed the importance of beginning the Vit D3 suppelment as suggested by MATT Guadalupe.  She is going to be getting a ferritin infusion in the near future.        HPI 10/17/23: Bobbi suffers from insomnia for 2-3 years which started sometime during the pandemic. She struggles falling asleep but finds that the melatonin helps her stay asleep.  Due to the lack of sleep she feels fatigued and will sometimes miss appointments.    Additionally, he anxiety is high and has been that way for at least 5 years.  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    Today she has LT sided neck pain which is not uncommon complaint. The trapezius is hypertonic.    Menstrual cycle is 28 days and recently started having bad cramps  before and during day 1 of the menstrual cycle.    We discussed the importance of doing things that she enjoys such as painting and walking in nature.  Additionally, yoga, Jose ji and qi gong were suggested.    Will started Rosa alisia wan herbal medicine.    Objective (Pulse, Tongue, Vitals): /72. Pulse is wiry in LT annabel and sl. Big and rapid.    TCM Diagnosis: LR heat disturbing the HT    Plan of Care: Acupuncture Therapy  1st set: Bilateral frost men, yin nj, An diane  2nd set: LT: LT LR 8, SP 9, 6, 4, LI 4, SJ 5, RT PC 7.2, HT 5, LG 7, ST 36, GB 41  Chinese herbal medicine: rosa alisia wan 4 BID    Patient Goals: Improve quality and length of sleep so she has enough energy during the day, improve anxiety by 50%    Face Time: 28 minutes  Total Time: 50 minutes      Treatment performed by Jagjit CAMP LAc. at [facility].    No follow-ups on file.    Jagjit Mendez L.AC  10/17/2024  7:07 PM

## 2025-04-14 ENCOUNTER — OFFICE VISIT (OUTPATIENT)
Dept: INTEGRATIVE MEDICINE | Facility: CLINIC | Age: 25
End: 2025-04-14
Payer: COMMERCIAL

## 2025-04-14 DIAGNOSIS — G47.00 INSOMNIA, UNSPECIFIED TYPE: Primary | ICD-10-CM

## 2025-04-14 NOTE — PROGRESS NOTES
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   Chief Complaint: Insomnia  Secondary Complaint: Dysmenorrhea  Tertiary Complaint: Anxiety    Self reported health status:     Patient reported severity of symptom(s) over the last 24 hours  With zero reporting no symptoms and 10 reporting the worst severity    Symptom 1: 4  Symptom 2: 3-5  Symptom 3: 3-4    Response to last TX: The insomnia varies depending on the nights; sometimes her sleep is good and at other times it is not.    LMP was 28 days with minimal cramping.    We discussed the anxiety which seems to have a layer of frustration and anger.  We discussed how important it was to move when she is anxious.  Jose ji and Qi gong were recommended.    Herbal medicine will be increased to 5 BID.    HPI 4/7/25: Bobbi has noticed a significant improvement in neck pain and HA and it has not been a significant complaint.    Her insomnia has improved since she started taking Lunesta and when she stops the insomnia gets worse.  Overall, she is sleeping better but will still have days when it is hard to sleep.    She continues to have menstrual cramps and this period day 1 was more painful than usual.  She does get breast tenderness, but moodiness is hard to discern as she tends toward moodiness.    She would like to re-start the rosa alisia wan at 4 BID as she thinks that helped in December; especially with her anxiety.    Recent blood panel depicted low Vit D3 - 20, low ferritin -12 as well as high testosterone -7.3.  The herbs may help with the testosterone. We discussed the importance of beginning the Vit D3 suppelment as suggested by MATT Guadalupe.  She is going to be getting a ferritin infusion in the near future.        HPI 10/17/23: Bobbi suffers from insomnia for 2-3 years which started sometime during the pandemic. She struggles falling asleep but finds that the melatonin helps her stay asleep.  Due to the lack of sleep she feels fatigued and will sometimes  miss appointments.    Additionally, he anxiety is high and has been that way for at least 5 years.  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    Today she has LT sided neck pain which is not uncommon complaint. The trapezius is hypertonic.    Menstrual cycle is 28 days and recently started having bad cramps before and during day 1 of the menstrual cycle.    We discussed the importance of doing things that she enjoys such as painting and walking in nature.  Additionally, yoga, Jose ji and qi gong were suggested.    Will started Rosa alisia wan herbal medicine.    Objective (Pulse, Tongue, Vitals): /72. Pulse is wiry in LT annabel and sl. Big and rapid.    TCM Diagnosis: LR heat disturbing the HT    Plan of Care: Acupuncture Therapy  1st set: Bilateral frost men, yin nj, An diane  2nd set: LT: LT LR 8, LR 3, SP 9, 6, 4, LI 4, SJ 5, RT PC 6, HT 5, LG 7, ST 36, GB 41  Chinese herbal medicine: rosa alisia wan 5 BID    Patient Goals: Improve quality and length of sleep so she has enough energy during the day, improve anxiety by 50%    Face Time: 28 minutes  Total Time: 50 minutes      Treatment performed by Jagjit CAMP LAc. at [facility].    No follow-ups on file.    Jagjit Mendez L.AC  10/17/2024  7:07 PM

## 2025-04-21 ENCOUNTER — OFFICE VISIT (OUTPATIENT)
Dept: INTEGRATIVE MEDICINE | Facility: CLINIC | Age: 25
End: 2025-04-21
Payer: COMMERCIAL

## 2025-04-21 DIAGNOSIS — G47.00 INSOMNIA, UNSPECIFIED TYPE: Primary | ICD-10-CM

## 2025-04-21 PROCEDURE — 97811 ACUP 1/> W/O ESTIM EA ADD 15: CPT | Performed by: ACUPUNCTURIST

## 2025-04-21 PROCEDURE — 97810 ACUP 1/> WO ESTIM 1ST 15 MIN: CPT | Performed by: ACUPUNCTURIST

## 2025-04-21 NOTE — PROGRESS NOTES
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   Chief Complaint: Insomnia  Secondary Complaint: Dysmenorrhea  Tertiary Complaint: Anxiety    Self reported health status:     Patient reported severity of symptom(s) over the last 24 hours  With zero reporting no symptoms and 10 reporting the worst severity    Symptom 1: 4  Symptom 2: 3  Symptom 3: 3-4    Response to last TX: The insomnia varies depending on the nights, but overall it has not been good.  She struggles to fall asleep and can wake up.  Her stress and anxiety have been high and are mixed with frustration and anger.    LMP was 28 days with minimal cramping.    We discussed how important it was to move when she is anxious.  Jose ji and Qi gong were recommended.    Herbal medicine will be changed to zoe rodriguez rosa alisia wan plus aisha nj    HPI 4/7/25: Bobbi has noticed a significant improvement in neck pain and HA and it has not been a significant complaint.    Her insomnia has improved since she started taking Lunesta and when she stops the insomnia gets worse.  Overall, she is sleeping better but will still have days when it is hard to sleep.    She continues to have menstrual cramps and this period day 1 was more painful than usual.  She does get breast tenderness, but moodiness is hard to discern as she tends toward moodiness.    She would like to re-start the rosa alisia wan at 4 BID as she thinks that helped in December; especially with her anxiety.    Recent blood panel depicted low Vit D3 - 20, low ferritin -12 as well as high testosterone -7.3.  The herbs may help with the testosterone. We discussed the importance of beginning the Vit D3 suppelment as suggested by MATT Guadalupe.  She is going to be getting a ferritin infusion in the near future.        HPI 10/17/23: Bobbi suffers from insomnia for 2-3 years which started sometime during the pandemic. She struggles falling asleep but finds that the melatonin helps her stay asleep.  Due to  the lack of sleep she feels fatigued and will sometimes miss appointments.    Additionally, he anxiety is high and has been that way for at least 5 years.  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    Today she has LT sided neck pain which is not uncommon complaint. The trapezius is hypertonic.    Menstrual cycle is 28 days and recently started having bad cramps before and during day 1 of the menstrual cycle.    We discussed the importance of doing things that she enjoys such as painting and walking in nature.  Additionally, yoga, Jose ji and qi gong were suggested.    Will started Rosa alisia wan herbal medicine.    Objective (Pulse, Tongue, Vitals): Pulse is wiry in LT annabel and sl. Big and rapid.    TCM Diagnosis: LR heat disturbing the HT    Plan of Care: Acupuncture Therapy  1st set: Bilateral frost men, yin nj, Suze diane  2nd set: LT: LT LR 8, LR 3, SP 9, 6, 4, LI 4, SJ 5, RT PC 6, HT 5, LG 7, ST 36, GB 41  Chinese herbal medicine: rosa alisia wan 5 BID until new formulas arrive and then Bobbi rodriguez rosa alisia wan 3 TID and Tiffani medrano nj 2 TID    Patient Goals: Improve quality and length of sleep so she has enough energy during the day, improve anxiety by 50%    Face Time: 28 minutes  Total Time: 50 minutes      Treatment performed by Jagjit CAMP LAc. at [facility].    No follow-ups on file.    Jagjit Mendez L.AC  10/17/2024  7:07 PM

## 2025-04-21 NOTE — PATIENT INSTRUCTIONS
Continue to take the rosa alisia wan 5BID until the new formulas arrive then begin taking:  Bobbi diaz 3BID and Tiffani nj 2 BID     You should get a message from [employer] as per usual in your email.

## 2025-06-16 ENCOUNTER — HOSPITAL ENCOUNTER (EMERGENCY)
Facility: HOSPITAL | Age: 25
Discharge: HOME | End: 2025-06-16
Attending: EMERGENCY MEDICINE
Payer: COMMERCIAL

## 2025-06-16 VITALS
DIASTOLIC BLOOD PRESSURE: 73 MMHG | SYSTOLIC BLOOD PRESSURE: 107 MMHG | HEIGHT: 63 IN | RESPIRATION RATE: 14 BRPM | TEMPERATURE: 98 F | OXYGEN SATURATION: 100 % | HEART RATE: 62 BPM | WEIGHT: 137 LBS | BODY MASS INDEX: 24.27 KG/M2

## 2025-06-16 DIAGNOSIS — G47.00 INSOMNIA, UNSPECIFIED TYPE: Primary | ICD-10-CM

## 2025-06-16 PROCEDURE — 99282 EMERGENCY DEPT VISIT SF MDM: CPT

## 2025-06-16 PROCEDURE — 99283 EMERGENCY DEPT VISIT LOW MDM: CPT

## 2025-06-16 PROCEDURE — 90791 PSYCH DIAGNOSTIC EVALUATION: CPT

## 2025-06-16 RX ORDER — DIPHENHYDRAMINE HCL 25 MG
50 CAPSULE ORAL NIGHTLY PRN
Qty: 20 CAPSULE | Refills: 0 | Status: SHIPPED | OUTPATIENT
Start: 2025-06-16

## 2025-06-16 NOTE — BH LEVEL OF CARE ASSESSMENT
Crisis Evaluation Assessment    Bobbi Cruz YOB: 2000   Age 24 year old MRN [id number]   Formerly [facility] EMERGENCY DEPARTMENT Attending Peyman Sterling MD      Patient's legal sex: female  Patient identifies as: female  Patient's birth sex: female  Preferred pronouns: She/Her    Date of Service: 6/16/2025    Referral Source:  Referral Source  Where was crisis eval performed?: On-site  Referral Source: Self-Referral/Former Patient/Returning Patient    Reason for Crisis Evaluation   PT stated that her insomnia is bad and that the medications are not helping.            Collateral  PT denied          Suicide Crisis Syndrome:  Suicide Crisis Syndrome  Do you feel trapped with no good options left?: Yes  Are you overwhelmed, or have you lost control by negative thoughts filling your head? : No    Suicide Risk Screening:  Source of information for CSSR: Patient  In what setting is the screener performed?: in person  1. Have you wished you were dead or wished you could go to sleep and not wake up? (past 30 days): No  2. Have you actually had any thoughts of killing yourself? (past 30 days): No              6. Have you ever done anything, started to do anything, or prepared to do anything to end your life? (lifetime): No     Score - BH OV: No Risk    Suicide Risk Assessments:  Suicidal Thoughts, Plan and Intent (this information to be used in conjunction with CSSR-S Suicide Screening)  Describe thoughts, ideation and intent:: PT denied suicidal ideation, plan, and intent  Frequency: How many times have you had these thoughts?: Other (comment) (PT denied)  Duration: When you have the thoughts, how long do they last?: Other (comment) (PT denied)  Controllability: Could/can you stop thinking about killing yourself or wanting to die if you want to?: Other (comment) (PT denied)  Identify Risk Factors  Do you have access to lethal methods to attempt suicide?: No  Clinical Status:: Hopelessness, Major  depressive episode, Insomnia  Activating Events/Recent Stressors:: Other (comment) (PT denied)  Identify Protective Factors  Internal: Identifies reasons for living  External: Supportive social network of family or friends, Engaged in work or school  Risk Stratification  Risk Level: Low       PT denied suicidal ideation, plan, and intent            Non-Suicidal Self-Injury:   PT denied        Risk to Others  Aggression: PT denied HI, aggression, violence, or property destruction          Access to Means:  Access to Means  Has access to means to attempt suicide, self-injure, harm others, or damage property?: No  Access to Firearm/Weapon: No  Do you have a firearm owner identification (FOID) card?: No    Protective Factors:        Review of Psychiatric Systems:  Depression: PT reports sadness, helplessness, hopelessness, and worthlessness. PT reports loss of motivation and interest in things. PT stated that she has always had it but has been getting worse over the last 8 months.    Anxiety: PT denied    Psychosis: PT denied AVH, paranoia, vargas, delusions, and psychosis    Sleep: PT stated that she is getting 3-4 hours in a 24 hour period. PT stated that it is not consecutive though. PT stated that none of the medications she is prescribed is helping.    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 PT stated that she hates her job, the people and the company.    Home: PT stated that she is still completing ADLs.          Ability to Care for Self::   Home: PT stated that she is still completing ADLs.          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 PT stated that she hates her job, the people and the company.          Relevant Social History:  Living Status: PT stated that she lives with her mother, father, 3 sisters and her cat.    Support System: PT denied    Legal: PT denied          Jasper and Complex (as applicable):                                    Current Medical (as applicable):  Current Medical  Medical Problems Under Current Treatment that will need to be continued after psychiatric admission: PT denied  Do you have a Primary Care Physician?: Yes  Primary Care Physician Name: Dr. Christiansen  Does the Patient Have: None  Active Eating Disorder: No    EDP Assessment (as applicable):  IBW Calculations  Weight: 137 lb  BMI (Calculated): 24.3  IBW LBS [place]: 115 LBS  IBW %: 119.13 %  IBW + 10%: 126.5 LBS  IBW - 10%: 103.5 LBS                                                                    Abuse Assessment:  Abuse Assessment  Physical Abuse: Denies  Verbal Abuse: Denies  Neglect: Denies  Does anyone say or do something to you that makes you feel unsafe?: No  Have You Ever Been Harmed by a Partner/Caregiver?: No  Health Concerns r/t Abuse: No  Possible Abuse Reportable to:: Not appropriate for reporting to authorities    Mental Status Exam:   General Appearance  Characteristics: Appropriate clothing, Good hygiene  Eye Contact: Direct  Psychomotor Behavior  Gait/Movement: Normal  Abnormal movements: None  Posture: Relaxed  Rate of Movement: Normal  Mood and Affect  Mood or Feelings: Sadness, Irritability  Appropriateness of Affect: Congruent to mood, Appropriate to situation  Range of Affect:  Normal  Stability of Affect: Stable  Attitude toward staff: Co-operative  Speech  Rate of Speech: Appropriate  Flow of Speech: Appropriate  Intensity of Volume: Ordinary  Clarity: Clear  Cognition  Concentration: Unimpaired  Memory: Recent memory intact, Remote memory intact  Orientation Level: Oriented X4, Oriented to person, Oriented to place, Oriented to time, Oriented to situation  Insight: Good  Judgment: Fair  Thought Patterns  Clarity/Relevance: Coherent, Logical, Relevant to topic  Flow: Organized  Content: Ordinary  Level of Consciousness: Alert  Level of Consciousness: Alert  Behavior  Exhibited behavior: Appropriate to situation      Disposition:    Rationale for Treatment Recommendation:   PT is a 24 year old female who presented to the ED due to insomnia. PT stated that her insomnia is bad and that the medications are not helping. PT reports sadness, helplessness, hopelessness, and worthlessness. PT reports loss of motivation and interest in things. PT stated that she has always had it but has been getting worse over the last 8 months. PT stated that she is getting 3-4 hours in a 24 hour period. PT stated that it is not consecutive though. PT stated that none of the medications she is prescribed is helping. PT denied anxiety, SI, HI, AVH, SIB, aggression, violence, property destruction, paranoia, vargas, delusions, and psychosis.    C-SSRS Score - no risk  Suicide assessment - low risk               Level of Care Recommendations  Consulted with: Dr. Sterling  Level of Care Recommendation: Partial Hospitalization  Program: Adult, Mental Health  Location: [facility]  Reason for Unit Assigned: Age/Sx  Partial Criteria: Moderate to severe impairment in role performance  Refused Treatment: Yes  Can an 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

## 2025-06-16 NOTE — ED INITIAL ASSESSMENT (HPI)
Pt ambulated into the ED with c/o insomnia for about 8 months now. Pt states she is having headaches and it is hard to function in daily life. She has seen many doctors about this and has tried numerous medications including Trazodone, Lunesta, melatonin & Ambien

## 2025-06-16 NOTE — ED QUICK NOTES
Pt cleared for discharge by Asher KAMINSKI. This RN provided pt with discharge teaching, pt verbalized understanding of information. RN discussed medications with pt, pt confirmed pharmacy with this RN. VS obtained prior to dispo. No further questions regarding discharge.

## 2025-06-16 NOTE — ED PROVIDER NOTES
Patient Seen in: [facility] Emergency Department      History     Stated Complaint: Unable to sleep  History of Present Illness  Bobbi Cruz is a 24 year old female who presents with chronic insomnia.    She experiences chronic insomnia for several months, describing it as 'never sleeping.' She maintains good sleep hygiene and limits caffeine intake to one coffee in the morning. She has tried approximately eight different medications, including melatonin, trazodone, Ambien, and quetiapine, without relief. 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 She is currently seeing a therapist for depression, but her sleep issues persist.    Patient is frustrated because multiple medications have been given to her and nothing seems to work.  She said she cannot do her job any longer and is considering quitting.    [Note: Patient (or parent) aware that ambient AI utilized for transcribing the HPI.]  Objective:   Past Medical History:    Closed displaced fracture of styloid process of right ulna, initial encounter    Hematuria    Insomnia    Instability of left shoulder joint    Kidney anomaly, congenital    Two vessel cord (HCC)              Past Surgical History:   Procedure Laterality Date    Anesth,nose,sinus surgery  12/2020    deviated septum     Other surgical history      Biopsy left leg    Screven teeth removed  12/2020                Social History     Socioeconomic History    Marital status: Single   Tobacco Use    Smoking status: Never    Smokeless tobacco: Never   Vaping Use    Vaping status: Never Used   Substance and Sexual Activity    Alcohol use: Not Currently     Comment: occacially    Drug use: Not Currently    Sexual activity: Never     Social Drivers of Health     Food Insecurity: Low Risk  (11/13/2023)    Received from [facility]    Food Insecurity     Have there been times that your  food ran out, and you didn't have money to get more?: No     Are there times that you worry that this might happen?: No   Transportation Needs: Low Risk  (11/13/2023)    Received from [facility]    Transportation Needs     Do you have trouble getting transportation to medical appointments?: No              Review of Systems    Positive for stated complaint: Unable to sleep  Other systems are as noted in HPI.  Constitutional and vital signs reviewed.      All other systems reviewed and negative except as noted above.    Physical Exam     ED Triage Vitals [06/16/25 0607]   /81   Pulse 56   Resp 18   Temp 98.2 °F (36.8 °C)   Temp src Temporal   SpO2 99 %   O2 Device None (Room air)       Current:/73   Pulse 62   Temp 98.2 °F (36.8 °C) (Temporal)   Resp 14   Ht 160 cm (5' 3\")   Wt 62.1 kg   LMP 06/01/2025 (Approximate)   SpO2 100%   BMI 24.27 kg/m²     General:  Vitals as listed.  No acute distress   HEENT: Sclerae anicteric.  Conjunctivae show no pallor.  Oropharynx clear, mucous membranes moist   Lungs: good air exchange   Extremities: no edema, normal peripheral pulses   Neuro: Alert oriented and nonfocal      ED COURSE and MDM     Differential diagnosis before testing included depression versus active suicide intent, a medical condition that poses a threat to life.    I reviewed prior external notes including evaluation at [facility] on 6/9/2025 and they gave recommendations for controlling anxiety/mood    To be seen by crisis worker.    Patient is disinterested in the PHP program.  She was given referrals by the crisis worker.  I recommended that she start taking Benadryl before bedtime.    I have discussed with the patient the results of testing, differential diagnosis, and treatment plan. They expressed clear understanding of these instructions and agrees to the plan provided.    Disposition and Plan     Clinical Impression:  1. Insomnia, unspecified type          Disposition:  Discharge  6/16/2025  7:50 am    Follow-up:  [facility]  [address] [address] [phone]  Go in 1 week(s)          Medications Prescribed:  Current Discharge Medication List        START taking these medications    Details   diphenhydrAMINE 25 MG Oral Cap Take 2 capsules (50 mg total) by mouth nightly as needed for Itching (insomnia).  Qty: 20 capsule, Refills: 0

## 2025-06-16 NOTE — DISCHARGE INSTRUCTIONS
Berry Can  Psychiatrist ([facility])  [address], [place], [place], [address]  [email or web address]  [phone] Extension: [phone]    Kim Morales  Physician Assistant ([facility])  [address], [place], [place], [address]  [email or web address]  [phone] Extension: [phone]    Rinku Robertson  Psychiatrist ([facility])  [address], [place], [place], [address]  [email or web address]  [phone] Extension: [phone]    Glenis Teresa  Psychiatrist ([facility])  [facility], [place], [place], [address]  [email or web address]  [phone] Extension: [phone]

## 2025-06-19 ENCOUNTER — OFFICE VISIT (OUTPATIENT)
Dept: INTEGRATIVE MEDICINE | Facility: CLINIC | Age: 25
End: 2025-06-19
Payer: COMMERCIAL

## 2025-06-19 DIAGNOSIS — G47.00 INSOMNIA, UNSPECIFIED TYPE: Primary | ICD-10-CM

## 2025-06-20 NOTE — PROGRESS NOTES
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   Chief Complaint: Insomnia  Secondary Complaint: Dysmenorrhea  Tertiary Complaint: Anxiety    Self reported health status:     Patient reported severity of symptom(s) over the last 24 hours  With zero reporting no symptoms and 10 reporting the worst severity    Symptom 1: 4  Symptom 2: 3  Symptom 3: 3-4    Response to last TX: Have not seen Bobbi since 4/21/25.  She has been inconsistent with her herbal medicine.  The insomnia has not improved and if anything has gotten worse.  She is currently taking amitriptyline, zolpidem and another supplement which will be verified.  She feels relaxed after she takes this medication but can not sleep. It is hard for her to stay awake and do her job.  She has seen multiple specialist and taking medication for anxiety and insomnia w/o effect.  Her stress and anxiety have been high and are mixed with frustration and anger.    She is considering moving in with her grandmother as she has had trouble sleeping in her parents home.    LMP was 28 days with minimal cramping.    Jose ji and Qi gong were recommended.    Herbal medicine will be changed.  Will pause current herbal medicine.    HPI 4/7/25: Bobbi has noticed a significant improvement in neck pain and HA and it has not been a significant complaint.    Her insomnia has improved since she started taking Lunesta and when she stops the insomnia gets worse.  Overall, she is sleeping better but will still have days when it is hard to sleep.    She continues to have menstrual cramps and this period day 1 was more painful than usual.  She does get breast tenderness, but moodiness is hard to discern as she tends toward moodiness.    She would like to re-start the rosa alisia wan at 4 BID as she thinks that helped in December; especially with her anxiety.    Recent blood panel depicted low Vit D3 - 20, low ferritin -12 as well as high testosterone -7.3.  The herbs may help with the  testosterone. We discussed the importance of beginning the Vit D3 suppelment as suggested by MATT Guadalupe.  She is going to be getting a ferritin infusion in the near future.        HPI 10/17/23: Bobbi suffers from insomnia for 2-3 years which started sometime during the pandemic. She struggles falling asleep but finds that the melatonin helps her stay asleep.  Due to the lack of sleep she feels fatigued and will sometimes miss appointments.    Additionally, he anxiety is high and has been that way for at least 5 years.  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    Today she has LT sided neck pain which is not uncommon complaint. The trapezius is hypertonic.    Menstrual cycle is 28 days and recently started having bad cramps before and during day 1 of the menstrual cycle.    We discussed the importance of doing things that she enjoys such as painting and walking in nature.  Additionally, yoga, Jose ji and qi gong were suggested.    Will started Rosa alisia wan herbal medicine.    Objective (Pulse, Tongue, Vitals): Pulse is wiry in LT annabel and RT annabel.  Overall puls eis wiry and slightly deeper in the cun and annabel.    TCM Diagnosis: LR heat disturbing the HT    Plan of Care: Acupuncture Therapy  1st set: Bilateral frost men, yin nj, An diane  2nd set: Bilateral LG 11, SP 1, DU 26, LR 8  Chinese herbal medicine: Pause rosa alisia wan 5 BID until new formulas arrive and then Bobbi rodriguez rosa alisia wan 3 TID and Tiffani medrano nj 2 TID    Patient Goals: Improve quality and length of sleep so she has enough energy during the day, improve anxiety by 50%    Face Time: 28 minutes  Total Time: 50 minutes      Treatment performed by Jagjit CAMP LAc. at [facility].    No follow-ups on file.    Jagjit Mendez L.AC  10/17/2024  7:07 PM

## 2025-06-26 ENCOUNTER — OFFICE VISIT (OUTPATIENT)
Dept: INTEGRATIVE MEDICINE | Facility: CLINIC | Age: 25
End: 2025-06-26
Payer: COMMERCIAL

## 2025-06-26 DIAGNOSIS — G47.00 INSOMNIA, UNSPECIFIED TYPE: Primary | ICD-10-CM

## 2025-06-26 NOTE — PROGRESS NOTES
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   Chief Complaint: Insomnia  Secondary Complaint: Dysmenorrhea  Tertiary Complaint: Anxiety    Self reported health status:     Patient reported severity of symptom(s) over the last 24 hours  With zero reporting no symptoms and 10 reporting the worst severity    Symptom 1: 3  Symptom 2: 3  Symptom 3: 3    Response to last TX: After the last tx, Bobbi also stopped her other sleep medications and has noticed an improvement in sleep and last night slept through the night.  As a result, she can think clearer and her mood is better.  She is contemplating getting her own apartment.    LMP was 28 days with minimal cramping.    Jose ji and Qi gong were recommended.    Given the improvement, herbal medicine will be paused temporarily and then kusum mcguire nj granules will be considered.  Alternatively zoe rodriguez rosa alisia wan plus mily dejesus wan could be an option.    Presently, she is taking Inositol, lexapro(SSRI) and occasionally naproxen(NSAID).    HPI 4/7/25: Bobbi has noticed a significant improvement in neck pain and HA and it has not been a significant complaint.    Her insomnia has improved since she started taking Lunesta and when she stops the insomnia gets worse.  Overall, she is sleeping better but will still have days when it is hard to sleep.    She continues to have menstrual cramps and this period day 1 was more painful than usual.  She does get breast tenderness, but moodiness is hard to discern as she tends toward moodiness.    She would like to re-start the rosa alisia wan at 4 BID as she thinks that helped in December; especially with her anxiety.    Recent blood panel depicted low Vit D3 - 20, low ferritin -12 as well as high testosterone -7.3.  The herbs may help with the testosterone. We discussed the importance of beginning the Vit D3 suppelment as suggested by MATT Guadalupe.  She is going to be getting a ferritin infusion in the near  future.        HPI 10/17/23: Bobbi suffers from insomnia for 2-3 years which started sometime during the pandemic. She struggles falling asleep but finds that the melatonin helps her stay asleep.  Due to the lack of sleep she feels fatigued and will sometimes miss appointments.    Additionally, he anxiety is high and has been that way for at least 5 years.  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    Today she has LT sided neck pain which is not uncommon complaint. The trapezius is hypertonic.    Menstrual cycle is 28 days and recently started having bad cramps before and during day 1 of the menstrual cycle.    We discussed the importance of doing things that she enjoys such as painting and walking in nature.  Additionally, yoga, Jose ji and qi gong were suggested.    Will started Nehaflorencio crump joe herbal medicine.    Objective (Pulse, Tongue, Vitals): Pulse is wiry in LT annabel and RT annabel.  Overall puls eis wiry and slightly deeper in the cun and annabel.    TCM Diagnosis: LR heat disturbing the HT    Plan of Care: Acupuncture Therapy  1st set: Bilateral frost men, yin nj, An diane  2nd set: Bilateral LG 11, SP 1, DU 26, LR 8  Chinese herbal medicine:     Patient Goals: Improve quality and length of sleep so she has enough energy during the day, improve anxiety by 50%    Face Time: 28 minutes  Total Time: 50 minutes      Treatment performed by Jagjit CAMP LAc. at [facility].    No follow-ups on file.    Jagjit Mendez L.AC  10/17/2024  7:07 PM

## 2025-07-30 PROBLEM — Z00.00 ENCOUNTER FOR PREVENTIVE HEALTH EXAMINATION: Status: ACTIVE | Noted: 2025-07-30

## 2025-08-04 ENCOUNTER — APPOINTMENT (OUTPATIENT)
Dept: CARDIOLOGY | Facility: CLINIC | Age: 25
End: 2025-08-04

## 2025-08-26 ENCOUNTER — APPOINTMENT (OUTPATIENT)
Dept: CARDIOLOGY | Facility: CLINIC | Age: 25
End: 2025-08-26

## 2025-08-28 ENCOUNTER — OFFICE VISIT (OUTPATIENT)
Dept: INTEGRATIVE MEDICINE | Facility: CLINIC | Age: 25
End: 2025-08-28

## 2025-08-28 DIAGNOSIS — G47.00 INSOMNIA, UNSPECIFIED TYPE: Primary | ICD-10-CM

## 2025-08-28 PROCEDURE — 97810 ACUP 1/> WO ESTIM 1ST 15 MIN: CPT | Performed by: ACUPUNCTURIST

## 2025-08-28 PROCEDURE — 97811 ACUP 1/> W/O ESTIM EA ADD 15: CPT | Performed by: ACUPUNCTURIST
